# Patient Record
Sex: FEMALE | Race: BLACK OR AFRICAN AMERICAN | URBAN - METROPOLITAN AREA
[De-identification: names, ages, dates, MRNs, and addresses within clinical notes are randomized per-mention and may not be internally consistent; named-entity substitution may affect disease eponyms.]

---

## 2017-01-04 ENCOUNTER — OFFICE VISIT (OUTPATIENT)
Dept: SURGERY | Age: 72
End: 2017-01-04

## 2017-01-04 VITALS
BODY MASS INDEX: 32.43 KG/M2 | HEIGHT: 63 IN | SYSTOLIC BLOOD PRESSURE: 149 MMHG | HEART RATE: 69 BPM | DIASTOLIC BLOOD PRESSURE: 70 MMHG | WEIGHT: 183 LBS

## 2017-01-04 DIAGNOSIS — Z87.898 HISTORY OF NIPPLE DISCHARGE: Primary | ICD-10-CM

## 2017-01-04 RX ORDER — VITAMIN E 268 MG
CAPSULE ORAL DAILY
COMMUNITY

## 2017-01-04 RX ORDER — GLUCOSAMINE SULFATE 1500 MG
POWDER IN PACKET (EA) ORAL DAILY
COMMUNITY

## 2017-01-04 RX ORDER — GUAIFENESIN 100 MG/5ML
81 LIQUID (ML) ORAL DAILY
COMMUNITY

## 2017-01-04 NOTE — PROGRESS NOTES
HISTORY OF PRESENT ILLNESS  Tangela Lawton is a 70 y.o. female. HPI  NEW patient consult referred by Dr. Tami Barajas for LEFT breast discharge. Patient noticed a thick, gray liquid at the end of her nipple about a month ago. It only happened that one time. She denies any other breast changes. She has recently increased her caffeine intake. Patient does not have pain.      Denies family history of breast or ovarian cancer.      Mammogram done at [facility], 12/2016, BIRADS 1    Past Medical History   Diagnosis Date    Bright disease        Past Surgical History   Procedure Laterality Date    Hx gyn       fibroids       Social History     Social History    Marital status: UNKNOWN     Spouse name: N/A    Number of children: N/A    Years of education: N/A     Occupational History    Not on file. Social History Main Topics    Smoking status: Never Smoker    Smokeless tobacco: Not on file    Alcohol use No    Drug use: Not on file    Sexual activity: Not on file     Other Topics Concern    Not on file     Social History Narrative    No narrative on file       No current outpatient prescriptions on file prior to visit. No current facility-administered medications on file prior to visit. Allergies   Allergen Reactions    Other Food Other (comments)     \"peanut in ice cream sent me to the hospital 4 years ago\"       OB History     Obstetric Comments    Menarche:  15. LMP: 46.  # of Children: 1 . Age at Delivery of First Child:  22.   Hysterectomy/oophorectomy:  NO/NO. Breast Bx:  no.  Hx of Breast Feeding:  no. BCP:  yes. Hormone therapy:  no.           ROS  Constitutional: Negative    HENT: Negative. Eyes: Negative. Respiratory: Negative. Cardiovascular: Negative. Gastrointestinal: Negative. Genitourinary: Negative. Musculoskeletal: Negative. Skin: Negative. Neurological: Negative. Endo/Heme/Allergies: Negative. Psychiatric/Behavioral: Negative.     Physical Exam Cardiovascular: Normal rate and normal heart sounds. Pulmonary/Chest: Breath sounds normal. Right breast exhibits no inverted nipple, no mass, no nipple discharge, no skin change and no tenderness. Left breast exhibits no inverted nipple, no mass, no nipple discharge, no skin change and no tenderness. Breasts are symmetrical.   Lymphadenopathy:        Right cervical: No superficial cervical, no deep cervical and no posterior cervical adenopathy present. Left cervical: No superficial cervical, no deep cervical and no posterior cervical adenopathy present. Right axillary: No pectoral and no lateral adenopathy present. Left axillary: No pectoral and no lateral adenopathy present. ASSESSMENT and PLAN    ICD-10-CM ICD-9-CM    1. History of nipple discharge Z87.898 V13.89      Pt with hx of LEFT nipple discharge occurring only once and not since. Last mammo looks good. Advised to observe and f/u if discharge recurs. This plan was reviewed with the patient and patient agrees. All questions were answered.     Written by Harpreet Baumann, as dictated by Dr. Claudeen Kanner, MD.

## 2017-01-04 NOTE — PROGRESS NOTES
HISTORY OF PRESENT ILLNESS  Shantell Haynes is a 70 y.o. female. HPI NEW patient consult referred by Dr. Krystal Gonzales for LEFT breast discharge. Patient noticed a thick, gray liquid at the end of her nipple about a month ago. It only happened that one time. She denies any other breast changes. She has recently increased her caffeine intake. Patient does not have pain. Denies family history of breast or ovarian cancer.      Mammogram done at [facility], 12/2016, BIRADS 1    ROS    Physical Exam    ASSESSMENT and PLAN  {ASSESSMENT/PLAN:04715}

## 2017-01-04 NOTE — LETTER
1/5/2017 1:14 PM 
 
Patient: Horace Billingsley YOB: 1945 Date of Visit: 1/4/2017 Dear Dr. Freda Osman: Thank you for referring Ms. Horace Billingsley to me for evaluation/treatment. Below are the relevant portions of my assessment and plan of care. HISTORY OF PRESENT ILLNESS Horace Billingsley is a 70 y.o. female. HPI 
NEW patient consult referred by Dr. Freda Osman for LEFT breast discharge. Patient noticed a thick, gray liquid at the end of her nipple about a month ago. It only happened that one time. She denies any other breast changes. She has recently increased her caffeine intake. Patient does not have pain.  
  
Denies family history of breast or ovarian cancer.  
  
Mammogram done at [facility], 12/2016, BIRADS 1 Past Medical History Diagnosis Date  Bright disease Past Surgical History Procedure Laterality Date  Hx gyn    
  fibroids Social History Social History  Marital status: UNKNOWN Spouse name: N/A  
 Number of children: N/A  
 Years of education: N/A Occupational History  Not on file. Social History Main Topics  Smoking status: Never Smoker  Smokeless tobacco: Not on file  Alcohol use No  
 Drug use: Not on file  Sexual activity: Not on file Other Topics Concern  Not on file Social History Narrative  No narrative on file No current outpatient prescriptions on file prior to visit. No current facility-administered medications on file prior to visit. Allergies Allergen Reactions  Other Food Other (comments) \"peanut in ice cream sent me to the hospital 4 years ago\" OB History Obstetric Comments Menarche:  15. LMP: 46.  # of Children: 1 . Age at Delivery of First Child:  22.   Hysterectomy/oophorectomy:  NO/NO. Breast Bx:  no.  Hx of Breast Feeding:  no. BCP:  yes. Hormone therapy:  no.   
  
 
 
ROS Constitutional: Negative HENT: Negative. Eyes: Negative. Respiratory: Negative. Cardiovascular: Negative. Gastrointestinal: Negative. Genitourinary: Negative. Musculoskeletal: Negative. Skin: Negative. Neurological: Negative. Endo/Heme/Allergies: Negative. Psychiatric/Behavioral: Negative. Physical Exam  
Cardiovascular: Normal rate and normal heart sounds. Pulmonary/Chest: Breath sounds normal. Right breast exhibits no inverted nipple, no mass, no nipple discharge, no skin change and no tenderness. Left breast exhibits no inverted nipple, no mass, no nipple discharge, no skin change and no tenderness. Breasts are symmetrical.  
Lymphadenopathy:  
     Right cervical: No superficial cervical, no deep cervical and no posterior cervical adenopathy present. Left cervical: No superficial cervical, no deep cervical and no posterior cervical adenopathy present. Right axillary: No pectoral and no lateral adenopathy present. Left axillary: No pectoral and no lateral adenopathy present. ASSESSMENT and PLAN 
  ICD-10-CM ICD-9-CM 1. History of nipple discharge Z87.898 V13.89 Pt with hx of LEFT nipple discharge occurring only once and not since. Last mammo looks good. Advised to observe and f/u if discharge recurs. This plan was reviewed with the patient and patient agrees. All questions were answered. Written by Alisson Dutton, as dictated by Dr. Pita Caballero MD.  
 
 
If you have questions, please do not hesitate to call me. I look forward to following Ms. Rosa along with you.  
 
 
 
Sincerely, 
 
 
Nic Schwartz MD

## 2017-01-04 NOTE — PATIENT INSTRUCTIONS
Nipple Discharge: Care Instructions  Your Care Instructions  Fluid leaking from one or both nipples when you are not breastfeeding is called nipple discharge. Clear, cloudy, or white discharge that appears only when you press on your nipple is usually normal. The more the nipple is pressed or stimulated, the more fluid appears. Yellow, green, or brown discharge is not normal and may be a symptom of an infection or other problem. Spontaneous discharge appears without pressing or stimulating the nipple. This is not normal unless you are pregnant or breastfeeding. It may be a side effect of a medicine, or it may be caused by other health problems. The treatment of spontaneous nipple discharge depends on what is causing it. You may need additional tests to find out what is causing the nipple discharge. Follow-up care is a key part of your treatment and safety. Be sure to make and go to all appointments, and call your doctor if you are having problems. Its also a good idea to know your test results and keep a list of the medicines you take. How can you care for yourself at home? · If your doctor gave you medicine, take it exactly as prescribed. Call your doctor if you think you are having a problem with your medicine. · Wear a supportive bra, such as a sports bra or jog bra. · Avoid stimulating your breast until you have your follow-up appointment. When should you call for help? Call your doctor now or seek immediate medical care if:  · You have symptoms of a breast infection, such as:  ¨ Increased pain, swelling, redness, or warmth around a breast.  ¨ Red streaks extending from the breast.  ¨ Pus draining from a breast.  ¨ A fever. Watch closely for changes in your health, and be sure to contact your doctor if:  · You notice any changes in your breast or discharge. · You do not get better as expected. Where can you learn more? Go to http://andres-yimi.info/.   Enter Q780 in the search box to learn more about \"Nipple Discharge: Care Instructions. \"  Current as of: May 27, 2016  Content Version: 11.1  © 9210-5317 [employer]. Care instructions adapted under license by [employer] (which disclaims liability or warranty for this information). If you have questions about a medical condition or this instruction, always ask your healthcare professional. [address] any warranty or liability for your use of this information. Nipple Discharge: Care Instructions  Your Care Instructions  Fluid leaking from one or both nipples when you are not breastfeeding is called nipple discharge. Clear, cloudy, or white discharge that appears only when you press on your nipple is usually normal. The more the nipple is pressed or stimulated, the more fluid appears. Yellow, green, or brown discharge is not normal and may be a symptom of an infection or other problem. Spontaneous discharge appears without pressing or stimulating the nipple. This is not normal unless you are pregnant or breastfeeding. It may be a side effect of a medicine, or it may be caused by other health problems. The treatment of spontaneous nipple discharge depends on what is causing it. You may need additional tests to find out what is causing the nipple discharge. Follow-up care is a key part of your treatment and safety. Be sure to make and go to all appointments, and call your doctor if you are having problems. Its also a good idea to know your test results and keep a list of the medicines you take. How can you care for yourself at home? · If your doctor gave you medicine, take it exactly as prescribed. Call your doctor if you think you are having a problem with your medicine. · Wear a supportive bra, such as a sports bra or jog bra. · Avoid stimulating your breast until you have your follow-up appointment. When should you call for help?   Call your doctor now or seek immediate medical care if:  · You have symptoms of a breast infection, such as:  ¨ Increased pain, swelling, redness, or warmth around a breast.  ¨ Red streaks extending from the breast.  ¨ Pus draining from a breast.  ¨ A fever. Watch closely for changes in your health, and be sure to contact your doctor if:  · You notice any changes in your breast or discharge. · You do not get better as expected. Where can you learn more? Go to http://andres-yimi.info/. Enter T124 in the search box to learn more about \"Nipple Discharge: Care Instructions. \"  Current as of: May 27, 2016  Content Version: 11.1  © 5634-0724 [email or web address]. Care instructions adapted under license by [employer] (which disclaims liability or warranty for this information). If you have questions about a medical condition or this instruction, always ask your healthcare professional. [place] [address] any warranty or liability for your use of this information.

## 2017-01-05 ENCOUNTER — DOCUMENTATION ONLY (OUTPATIENT)
Dept: SURGERY | Age: 72
End: 2017-01-05

## 2017-01-05 NOTE — PROGRESS NOTES
Type of Film: [x] CD [] FILMS  Type of Test: [] MRI [x] MAMMO  From: [facility]  Given to:   LOCATION - Mailed to patient's home address after appointment.    To be Downloaded into PACS:  No

## 2017-01-05 NOTE — COMMUNICATION BODY
HISTORY OF PRESENT ILLNESS  Kwame Burgess is a 70 y.o. female. HPI  NEW patient consult referred by Dr. Kim Del Castillo for LEFT breast discharge. Patient noticed a thick, gray liquid at the end of her nipple about a month ago. It only happened that one time. She denies any other breast changes. She has recently increased her caffeine intake. Patient does not have pain.      Denies family history of breast or ovarian cancer.      Mammogram done at [facility], 12/2016, BIRADS 1    Past Medical History   Diagnosis Date    Bright disease        Past Surgical History   Procedure Laterality Date    Hx gyn       fibroids       Social History     Social History    Marital status: UNKNOWN     Spouse name: N/A    Number of children: N/A    Years of education: N/A     Occupational History    Not on file. Social History Main Topics    Smoking status: Never Smoker    Smokeless tobacco: Not on file    Alcohol use No    Drug use: Not on file    Sexual activity: Not on file     Other Topics Concern    Not on file     Social History Narrative    No narrative on file       No current outpatient prescriptions on file prior to visit. No current facility-administered medications on file prior to visit. Allergies   Allergen Reactions    Other Food Other (comments)     \"peanut in ice cream sent me to the hospital 4 years ago\"       OB History     Obstetric Comments    Menarche:  15. LMP: 46.  # of Children: 1 . Age at Delivery of First Child:  22.   Hysterectomy/oophorectomy:  NO/NO. Breast Bx:  no.  Hx of Breast Feeding:  no. BCP:  yes. Hormone therapy:  no.           ROS  Constitutional: Negative    HENT: Negative. Eyes: Negative. Respiratory: Negative. Cardiovascular: Negative. Gastrointestinal: Negative. Genitourinary: Negative. Musculoskeletal: Negative. Skin: Negative. Neurological: Negative. Endo/Heme/Allergies: Negative. Psychiatric/Behavioral: Negative.     Physical Exam Cardiovascular: Normal rate and normal heart sounds. Pulmonary/Chest: Breath sounds normal. Right breast exhibits no inverted nipple, no mass, no nipple discharge, no skin change and no tenderness. Left breast exhibits no inverted nipple, no mass, no nipple discharge, no skin change and no tenderness. Breasts are symmetrical.   Lymphadenopathy:        Right cervical: No superficial cervical, no deep cervical and no posterior cervical adenopathy present. Left cervical: No superficial cervical, no deep cervical and no posterior cervical adenopathy present. Right axillary: No pectoral and no lateral adenopathy present. Left axillary: No pectoral and no lateral adenopathy present. ASSESSMENT and PLAN    ICD-10-CM ICD-9-CM    1. History of nipple discharge Z87.898 V13.89      Pt with hx of LEFT nipple discharge occurring only once and not since. Last mammo looks good. Advised to observe and f/u if discharge recurs. This plan was reviewed with the patient and patient agrees. All questions were answered.     Written by Shanta Reagan, as dictated by Dr. Washington Holland MD.